# Patient Record
Sex: FEMALE | Race: WHITE | NOT HISPANIC OR LATINO | URBAN - METROPOLITAN AREA
[De-identification: names, ages, dates, MRNs, and addresses within clinical notes are randomized per-mention and may not be internally consistent; named-entity substitution may affect disease eponyms.]

---

## 2023-06-14 ENCOUNTER — DOCUMENTATION (OUTPATIENT)
Dept: HEMATOLOGY ONCOLOGY | Facility: CLINIC | Age: 53
End: 2023-06-14

## 2023-06-14 NOTE — PROGRESS NOTES
Intake received, chart reviewed for need of external records  Patient noted to have a history of Breast Cancer  Per intake, patient was advised to have records faxed to [phone] for office review

## 2023-06-29 ENCOUNTER — TELEPHONE (OUTPATIENT)
Dept: HEMATOLOGY ONCOLOGY | Facility: CLINIC | Age: 53
End: 2023-06-29

## 2023-06-29 NOTE — TELEPHONE ENCOUNTER
Lm on vm requesting patient to have all her records faxed to [phone], we have not received them  Provided her with [phone] if she has questions/concerns

## 2023-07-03 ENCOUNTER — CONSULT (OUTPATIENT)
Age: 53
End: 2023-07-03
Payer: COMMERCIAL

## 2023-07-03 ENCOUNTER — TELEPHONE (OUTPATIENT)
Dept: HEMATOLOGY ONCOLOGY | Facility: CLINIC | Age: 53
End: 2023-07-03

## 2023-07-03 VITALS
TEMPERATURE: 98 F | RESPIRATION RATE: 17 BRPM | SYSTOLIC BLOOD PRESSURE: 149 MMHG | WEIGHT: 183 LBS | OXYGEN SATURATION: 96 % | HEART RATE: 105 BPM | DIASTOLIC BLOOD PRESSURE: 88 MMHG

## 2023-07-03 DIAGNOSIS — Z15.01 BRCA2 POSITIVE: Primary | ICD-10-CM

## 2023-07-03 DIAGNOSIS — Z15.09 BRCA2 POSITIVE: Primary | ICD-10-CM

## 2023-07-03 DIAGNOSIS — C50.311 MALIGNANT NEOPLASM OF LOWER-INNER QUADRANT OF RIGHT BREAST OF FEMALE, ESTROGEN RECEPTOR POSITIVE (HCC): ICD-10-CM

## 2023-07-03 DIAGNOSIS — Z17.0 MALIGNANT NEOPLASM OF LOWER-INNER QUADRANT OF RIGHT BREAST OF FEMALE, ESTROGEN RECEPTOR POSITIVE (HCC): ICD-10-CM

## 2023-07-03 PROCEDURE — 99204 OFFICE O/P NEW MOD 45 MIN: CPT | Performed by: INTERNAL MEDICINE

## 2023-07-03 RX ORDER — VENLAFAXINE HYDROCHLORIDE 150 MG/1
CAPSULE, EXTENDED RELEASE ORAL
COMMUNITY
Start: 2023-06-05

## 2023-07-03 RX ORDER — ATORVASTATIN CALCIUM 10 MG/1
TABLET, FILM COATED ORAL
COMMUNITY
Start: 2023-07-02

## 2023-07-03 NOTE — TELEPHONE ENCOUNTER
Patient Call    Who are you speaking with? self   If it is not the patient, are they listed on an active communication consent form? self   What is the reason for this call? Have records arrived for [facility] today? Does this require a call back? yes   If a call back is required, please list [facility] call back number [phone]   If a call back is required, advise that a message will be forwarded to their care team and someone will return their call as soon as possible. Did you relay this information to the patient?  yes

## 2023-07-04 NOTE — PROGRESS NOTES
Oncology Outpatient Consult Note  Lilian Ho 52 y.o. female MRN: @ Encounter: [id number]        Date:  7/4/2023        CC: History of breast cancer, BRCA2 pathogenic mutation      HPI:  Lilian Ho is seen for initial consultation 7/4/2023 regarding her BRCA2 pathogenic mutation and history of her early-stage ER positive breast cancer. Please see on history for full details. She was my patient at [facility] and is establishing care here with me. 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 ([place], [place]). She is up-to-date on her yearly dermatology evaluation. She is up-to-date on her colonoscopy. She had bilateral mastectomy with silicone breast implants and has been noticing that the left implant seems to be bulging laterally. It is a little bit sore. She is taking low-dose Effexor for hot flashes and it is helping a lot. She also reports that she just had a recent abnormal Pap smear at her GYN within the past 3 months she did have her cervix removed when she had her BISI/BSO. She is also feeling a little nervous today because she is 52years old and this is the age that her father was diagnosed with pancreatic cancer. She denies any headaches. No chest pain or shortness of breath. Some new intermittent constipation and diarrhea. No bright red blood per rectum. She also saw a rheumatologist at both [facility] and at [facility]. She was having a lot of joint pain and there was an initial concern for scleroderma but that diagnosis cannot be confirmed at [facility]. ROS: As stated in history of present illness otherwise her 14 point review of systems today was negative.     ECOG PS: 0    Cancer Staging:  Cancer Staging   Malignant neoplasm of lower-inner quadrant of right breast of female, estrogen receptor positive ([address])  Staging form: Breast, AJCC 8th Edition  - Pathologic stage from 8/8/2017: Stage IA (pT1a, pN0, cM0, G1, ER+, WY+, HER2-) - Signed by Abigail Foster DO on 7/4/2023  Nuclear grade: G1  Histologic grading system: 3 grade system      Molecular Testing:     Oncology History Overview Note   6/2017 - tested positive for BRCA2.     8/2017- Right breast, pT1a,N0,M0 IDC, ER+,WY+,Her2 vannesa -, G1    10/2017- Bilateral nipple sparing mastectomy with TE reconstruction, focal positive margin    11/2017 - positive margin re-excised    12/2017 - tamoxifen started    3/2018 - tamoxifen stopped    4/2018 - s/p BISI/BSO    Did not attempt adjuvant AI due to severe menopausal symptoms     Malignant neoplasm of lower-inner quadrant of right breast of female, estrogen receptor positive ([address])   8/8/2017 -  Cancer Staged    Staging form: Breast, AJCC 8th Edition  - Pathologic stage from 8/8/2017: Stage IA (pT1a, pN0, cM0, G1, ER+, WY+, HER2-) - Signed by Abigail Foster DO on 7/4/2023  Nuclear grade: G1  Histologic grading system: 3 grade system       7/3/2023 Initial Diagnosis    Malignant neoplasm of lower-inner quadrant of right breast of female, estrogen receptor positive ([address])             Test Results:    Imaging: No results found. Labs: No results found for: "WBC", "HGB", "HCT", "MCV", "PLT"  No results found for: "NA", "K", "CL", "CO2", "ANIONGAP", "BUN", "CREATININE", "GLUCOSE", "GLUF", "CALCIUM", "Julia Albino", "AST", "ALT", "ALKPHOS", "PROT", "BILITOT", "EGFR"      No results found for: "SPEP", "UPEP"    No results found for: "PSA"    No results found for: "CEA"    No results found for: "AFP"    No results found for: "IRON", "TIBC", "FERRITIN"    No results found for: "[id number]"            Active Problems:   Patient Active Problem List   Diagnosis   • Malignant neoplasm of lower-inner quadrant of right breast of female, estrogen receptor positive ([address])       Past Medical History: No past medical history on file. Surgical History: No past surgical history on file. Family History:  No family history on file.     Cancer-related family history is not on file. Social History:   Social History     Socioeconomic History   • Marital status: /Civil Union     Spouse name: Not on file   • Number of children: Not on file   • Years of education: Not on file   • Highest education level: Not on file   Occupational History   • Not on file   Tobacco Use   • Smoking status: Never   • Smokeless tobacco: Never   Substance and Sexual Activity   • Alcohol use: Not on file   • Drug use: Not on file   • Sexual activity: Not on file   Other Topics Concern   • Not on file   Social History Narrative   • Not on file     Social Determinants of Health     Financial Resource Strain: Not on file   Food Insecurity: Not on file   Transportation Needs: Not on file   Physical Activity: Not on file   Stress: Not on file   Social Connections: Not on file   Intimate Partner Violence: Not on file   Housing Stability: Not on file       Current Medications:   Current Outpatient Medications   Medication Sig Dispense Refill   • atorvastatin (LIPITOR) 10 mg tablet      • Cholecalciferol 25 MCG (1000 UT) tablet Take 1,000 Units by mouth daily     • venlafaxine (EFFEXOR-XR) 150 mg 24 hr capsule TAKE 1 CAPSULE BY MOUTH EVERY DAY FOR ANXIETY/DEPRESSION       No current facility-administered medications for this visit. Allergies: Not on File      Physical Exam:    There is no height or weight on file to calculate BSA. Wt Readings from Last 3 Encounters:   07/03/23 83 kg (183 lb)        Temp Readings from Last 3 Encounters:   07/03/23 98 °F (36.7 °C)        BP Readings from Last 3 Encounters:   07/03/23 149/88         Pulse Readings from Last 3 Encounters:   07/03/23 105     @LASTSAO2(3)@    Physical Exam     Constitutional   General appearance: No acute distress, well appearing and well nourished. Eyes   Conjunctiva and lids: No swelling, erythema or discharge. Pupils and irises: Equal, round and reactive to light.     Ears, Nose, Mouth, and Throat   External inspection of ears and nose: Normal.    Nasal mucosa, septum, and turbinates: Normal without edema or erythema. Oropharynx: Normal with no erythema, edema, exudate or lesions. Pulmonary   Respiratory effort: No increased work of breathing or signs of respiratory distress. Auscultation of lungs: Clear to auscultation. Cardiovascular   Palpation of heart: Normal PMI, no thrills. Auscultation of heart: Normal rate and rhythm, normal S1 and S2, without murmurs. Examination of extremities for edema and/or varicosities: Normal.    Carotid pulses: Normal.    Abdomen   Abdomen: Non-tender, no masses. Liver and spleen: No hepatomegaly or splenomegaly. Lymphatic   Palpation of lymph nodes in neck: No lymphadenopathy. Musculoskeletal   Gait and station: Normal.    Digits and nails: Normal without clubbing or cyanosis. Inspection/palpation of joints, bones, and muscles: Normal.    Skin   Skin and subcutaneous tissue: Normal without rashes or lesions. Neurologic   Cranial nerves: Cranial nerves 2-12 intact. Sensation: No sensory loss. Psychiatric   Orientation to person, place, and time: Normal.    Mood and affect: Normal.    Breast exam - b/l mastectomy with implant reconstruction, no masses or skin changes. Implant does appear to deviated laterally        Assessment/ Plan: 58-year-old female with a history of early-stage breast cancer with a BRCA2 pathogenic mutation. I am going to order an MRI of the breast to assess her silicone implants which are now 10years old. She has not had any new imaging since they were placed. I am also going to order an MRCP for pancreatic cancer screening due to her BRCA2 mutation and first-degree relative with pancreatic cancer. I will call her with both of those results. I will attempt to get a copy of the path report from her GYN that showed the abnormal changes. The patient does not have a cervix I am assuming this was taken from the vaginal cuff. The patient also has 3 daughters ages 16, 24 and 21. We discussed considerations of testing them. We discussed that they should get all the life, disability, and long-term care insurance they will want prior to getting testing. [place] act protects them from employment and health insurance discrimination but not from discrimination for other types of insurance. They should most definitely do it under the direction of a genetic counselor when they get tested. The older 2 are coming close to an age where we would start screening, which would be breast MRI at age 22. I will continue to see Yusra on a yearly basis. She knows to call in the interim with any questions or concerns. I spent 45 minutes in chart review, face to face counseling, coordination of care, and documentation.

## 2023-07-05 ENCOUNTER — TELEPHONE (OUTPATIENT)
Age: 53
End: 2023-07-05

## 2023-07-05 NOTE — TELEPHONE ENCOUNTER
Spoke with the nurse from [facility] at [phone]. The nurse is faxing the pap results to us at [phone].

## 2023-07-13 ENCOUNTER — TELEPHONE (OUTPATIENT)
Dept: HEMATOLOGY ONCOLOGY | Facility: CLINIC | Age: 53
End: 2023-07-13

## 2023-07-13 NOTE — TELEPHONE ENCOUNTER
Patient Call    Who are you speaking with? [facility]    If it is not the patient, are they listed on an active communication consent form? N/A   What is the reason for this call? Sabina Reid calling in stating that they need an authorization for the patient, CPT code is 07225. The patient is coming in on 7/17/23. Does this require a call back? Yes   If a call back is required, please list best call back number [phone]   If a call back is required, advise that a message will be forwarded to their care team and someone will return their call as soon as possible. Did you relay this information to the patient?  Yes

## 2024-03-09 ENCOUNTER — OFFICE VISIT (OUTPATIENT)
Dept: URGENT CARE | Facility: CLINIC | Age: 54
End: 2024-03-09
Payer: COMMERCIAL

## 2024-03-09 VITALS
HEART RATE: 86 BPM | SYSTOLIC BLOOD PRESSURE: 130 MMHG | HEIGHT: 68 IN | BODY MASS INDEX: 28.04 KG/M2 | DIASTOLIC BLOOD PRESSURE: 86 MMHG | RESPIRATION RATE: 16 BRPM | TEMPERATURE: 98.1 F | WEIGHT: 185 LBS | OXYGEN SATURATION: 96 %

## 2024-03-09 DIAGNOSIS — S70.261A TICK BITE OF RIGHT HIP, INITIAL ENCOUNTER: Primary | ICD-10-CM
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
## 2024-03-09 PROCEDURE — 99213 OFFICE O/P EST LOW 20 MIN: CPT

## 2024-03-09 RX ORDER — VONOPRAZAN FUMARATE 13.36 MG/1
1 TABLET ORAL DAILY
COMMUNITY
Start: 2024-03-05

## 2024-03-09 RX ORDER — DOXYCYCLINE 100 MG/1
200 TABLET ORAL ONCE
Qty: 2 TABLET | Refills: 0 | Status: SHIPPED | OUTPATIENT
Start: 2024-03-09 | End: 2024-03-09

## 2024-03-09 NOTE — PROGRESS NOTES
[facility]        NAME: Donna Montes De Oca is a 53 y.o. female  : 1970    MRN: [id number]  DATE: 2024  TIME: 4:12 PM    Assessment and Plan   Tick bite of right hip, initial encounter [S70.261A, W57.XXXA]  1. Tick bite of right hip, initial encounter  doxycycline (ADOXA) 100 MG tablet        No retained FB. Attachment to be believed under 24 hours however tick on cell phone appears engorged, will treat for Lyme Prophylaxis with Doxycycline. Encouraged continued supportive measures.  Monitor for symptoms of Lyme Disease. Follow up with PCP in 3-5 days or proceed to emergency department for worsening symptoms.  Patient verbalized understanding of instructions given.     Patient Instructions     Patient Instructions     Take antibiotic as prescribed  Standard wound care  Monitor for headache, fever/chills, body aches, joint pain, or rash  May need laboratory blood testing for Lyme Disease if symptoms develop   Follow up with PCP in 3-5 days.  Proceed to  ER if symptoms worsen.    If tests have been performed at [facility], our office will contact you with results if changes need to be made to the care plan discussed with you at the visit.  [facility]    Tick Bite   AMBULATORY CARE:   What you need to know about a tick bite:  Ticks need to be removed quickly. Most tick bites are not dangerous, but ticks can pass disease or infection when they bite. Diseases include Lyme disease, babesiosis, tularemia, and [place] Spotted Fever.  Signs and symptoms of a tick bite  may develop right away, or weeks or even months after a bite. You may have redness, pain, itching, and swelling near the bite area. Blisters may also develop. You may develop tick paralysis, a temporary condition that causes problems with leg movement. A disease from a tick bite may cause a fever, rash, body aches, or breathing problems.  Seek care immediately if:   You have trouble  walking or moving your legs.    You have joint pain, muscle pain, or muscle weakness within 1 month of a tick bite.    You have a fever, chills, headache, or rash.    Call your doctor if:   You cannot remove the tick.    The tick's head is stuck in your skin.    You have questions or concerns about your condition or care.    Treatment:  Treatment for a disease passed during the bite depends on the disease. You may only need medicines to lower a fever or fight a bacterial infection. More serious diseases can cause conditions such as breathing problems that need to be treated in a hospital. The following can help treat symptoms at the bite area:  Apply ice  on your bite for 15 to 20 minutes every hour or as directed. Use an ice pack, or put crushed ice in a plastic bag. Cover it with a towel before you apply it to your skin. Ice helps prevent tissue damage and decreases swelling and pain.    Medicines  help decrease pain, redness, itching, and swelling. You may also need medicine to prevent or fight a bacterial infection. These medicines may be given as a cream, lotion, or pill.    How to remove a tick:  Remove the tick as soon as possible to help prevent disease or infection. You are less likely to get sick from a tick bite if you remove the tick within 24 hours. Do not use petroleum jelly, nail polish, rubbing alcohol, or heat. These do not work and may be dangerous. Do the following to remove a tick:  First, try a soapy cotton ball. Soak a cotton ball in liquid soap. Cover the tick with the cotton ball for 30 seconds. The tick may come off with the cotton ball when you pull it away.    Use tweezers if the soapy cotton ball does not work. Grasp the tick as close to your skin as possible. Pull the tick straight up and out. Do not touch the tick with your bare hands.    Do not twist or jerk the tick suddenly, because this may break off the tick's head or mouth parts. Do not leave any part of the tick in your  skin.    Do not crush or squeeze the tick since its body may be infected with germs. Flush the tick down the toilet.    After the tick is removed, clean the area of the bite with rubbing alcohol. Then wash your hands with soap and water.       Prevent a tick bite:  Ticks live in areas covered by brush and grass. They may even be found in your lawn if you live in certain areas. Outdoor pets can carry ticks inside the house. Ticks can grab onto you or your clothes when you walk by grass or brush. If you go into areas that contain many trees, tall grasses, and underbrush, do the following:  Wear light colored pants and a long-sleeved shirt.  Tuck your pants into your socks or boots. Tuck in your shirt. Wear sleeves that fit close to the skin at your wrists and neck. This will help prevent ticks from crawling through gaps in your clothing and onto your skin. Wear a hat in areas with trees.    Apply insect repellant on your skin.  The insect repellant should contain DEET. Do not put insect repellant on skin that is cut, scratched, or irritated. Always use soap and water to wash the insect repellant off as soon as possible once you are indoors. Do not apply insect repellant on your child's face or hands.    Spray insect repellant onto your clothes.  Use permethrin spray. This spray kills ticks that crawl on your clothing. Be sure to spray the tops of your boots, bottom of pant legs, and sleeve cuffs. As soon as possible, wash and dry clothing in hot water and high heat.    Check your clothing, hair, and skin for ticks.  Shower within 2 hours of coming indoors. Carefully check the hairline, armpits, neck, and waist. Check your pets and children for ticks. Remove ticks from pets the same way as you remove them from people.    Decrease the risk for ticks in your yard.  Ticks like to live in shady, moist areas. Mow your lawn regularly to keep the grass short. Trim the grass around birdbaths and fences. Cut branches that are  "overgrown and take them out of the yard. Clear out leaf piles. Stack firewood in a dry, alvin area.    Follow up with your doctor as directed:  Write down your questions so you remember to ask them during your visits.  © Copyright [employer]ative 2023 Information is for End User's use only and may not be sold, redistributed or otherwise used for commercial purposes.  The above information is an  only. It is not intended as medical advice for individual conditions or treatments. Talk to your doctor, nurse or pharmacist before following any medical regimen to see if it is safe and effective for you.        Chief Complaint     Chief Complaint   Patient presents with    Tick Bite     Reports removed a tick from right hip area this morning. \"Tiny \"size.         History of Present Illness       53-year-old female with a past medical history significant for CKD and breast cancer in remission presents for tick bite.  Patient reports noticing engorged tick in shower this morning around 11:30 and small area of redness to right hip/buttock.  Unsure how long tick has been attached however does not believe tick to be embedded yesterday.  Reports does sleep in bed with 2 dogs.  No prior history of Lyme disease.  Denies fever, chills, flulike symptoms or rash.        Review of Systems   Review of Systems   Constitutional:  Negative for chills and fever.   Respiratory:  Negative for cough and shortness of breath.    Cardiovascular:  Negative for chest pain.   Gastrointestinal:  Negative for abdominal pain, diarrhea, nausea and vomiting.   Musculoskeletal:  Negative for arthralgias and myalgias.   Skin:  Positive for color change.         Current Medications       Current Outpatient Medications:     doxycycline (ADOXA) 100 MG tablet, Take 2 tablets (200 mg total) by mouth 1 (one) time for 1 dose, Disp: 2 tablet, Rfl: 0    venlafaxine (EFFEXOR-XR) 150 mg 24 hr capsule, TAKE 1 CAPSULE BY MOUTH EVERY DAY FOR ANXIETY/DEPRESSION, " "Disp: , Rfl:     Voquezna 10 MG TABS, Take 1 tablet by mouth daily, Disp: , Rfl:     atorvastatin (LIPITOR) 10 mg tablet, , Disp: , Rfl:     Cholecalciferol 25 MCG (1000 UT) tablet, Take 1,000 Units by mouth daily (Patient not taking: Reported on 3/9/2024), Disp: , Rfl:     Current Allergies     Allergies as of 03/09/2024 - Reviewed 03/09/2024   Allergen Reaction Noted    Hydroxychloroquine Hives, Itching, and Rash 03/17/2020    Sulfa antibiotics Rash 12/15/2015            The following portions of the patient's history were reviewed and updated as appropriate: allergies, current medications, past family history, past medical history, past social history, past surgical history and problem list.     Past Medical History:   Diagnosis Date    Cancer (HCC)     H/O: hysterectomy     Kidney disease        Past Surgical History:   Procedure Laterality Date    HYSTERECTOMY      MASTECTOMY Bilateral        History reviewed. No pertinent family history.      Medications have been verified.        Objective   /86   Pulse 86   Temp 98.1 °F (36.7 °C)   Resp 16   Ht 5' 7.5\" (1.715 m)   Wt 83.9 kg (185 lb)   SpO2 96%   BMI 28.55 kg/m²   No LMP recorded. Patient is postmenopausal.       Physical Exam     Physical Exam  Vitals and nursing note reviewed.   Constitutional:       General: She is not in acute distress.     Appearance: She is not toxic-appearing.   HENT:      Head: Normocephalic.      Nose: Nose normal.      Mouth/Throat:      Mouth: Mucous membranes are moist.      Pharynx: Oropharynx is clear.   Eyes:      Conjunctiva/sclera: Conjunctivae normal.   Cardiovascular:      Rate and Rhythm: Normal rate and regular rhythm.      Heart sounds: Normal heart sounds.   Pulmonary:      Effort: Pulmonary effort is normal. No respiratory distress.      Breath sounds: Normal breath sounds. No stridor. No wheezing, rhonchi or rales.   Skin:     General: Skin is warm and dry.      Findings: Erythema present.        "   Neurological:      Mental Status: She is alert and oriented to person, place, and time.      Gait: Gait is intact.   Psychiatric:         Mood and Affect: Mood normal.         Behavior: Behavior normal.

## 2024-03-09 NOTE — PATIENT INSTRUCTIONS
Take antibiotic as prescribed  Standard wound care  Monitor for headache, fever/chills, body aches, joint pain, or rash  May need laboratory blood testing for Lyme Disease if symptoms develop   Follow up with PCP in 3-5 days.  Proceed to  ER if symptoms worsen.    If tests have been performed at [facility], our office will contact you with results if changes need to be made to the care plan discussed with you at the visit.  You can review your full [facility] on [facility].    Tick Bite   AMBULATORY CARE:   What you need to know about a tick bite:  Ticks need to be removed quickly. Most tick bites are not dangerous, but ticks can pass disease or infection when they bite. Diseases include Lyme disease, babesiosis, tularemia, and [place] Spotted Fever.  Signs and symptoms of a tick bite  may develop right away, or weeks or even months after a bite. You may have redness, pain, itching, and swelling near the bite area. Blisters may also develop. You may develop tick paralysis, a temporary condition that causes problems with leg movement. A disease from a tick bite may cause a fever, rash, body aches, or breathing problems.  Seek care immediately if:   You have trouble walking or moving your legs.    You have joint pain, muscle pain, or muscle weakness within 1 month of a tick bite.    You have a fever, chills, headache, or rash.    Call your doctor if:   You cannot remove the tick.    The tick's head is stuck in your skin.    You have questions or concerns about your condition or care.    Treatment:  Treatment for a disease passed during the bite depends on the disease. You may only need medicines to lower a fever or fight a bacterial infection. More serious diseases can cause conditions such as breathing problems that need to be treated in a hospital. The following can help treat symptoms at the bite area:  Apply ice  on your bite for 15 to 20 minutes every hour or as directed. Use an ice pack, or put  crushed ice in a plastic bag. Cover it with a towel before you apply it to your skin. Ice helps prevent tissue damage and decreases swelling and pain.    Medicines  help decrease pain, redness, itching, and swelling. You may also need medicine to prevent or fight a bacterial infection. These medicines may be given as a cream, lotion, or pill.    How to remove a tick:  Remove the tick as soon as possible to help prevent disease or infection. You are less likely to get sick from a tick bite if you remove the tick within 24 hours. Do not use petroleum jelly, nail polish, rubbing alcohol, or heat. These do not work and may be dangerous. Do the following to remove a tick:  First, try a soapy cotton ball. Soak a cotton ball in liquid soap. Cover the tick with the cotton ball for 30 seconds. The tick may come off with the cotton ball when you pull it away.    Use tweezers if the soapy cotton ball does not work. Grasp the tick as close to your skin as possible. Pull the tick straight up and out. Do not touch the tick with your bare hands.    Do not twist or jerk the tick suddenly, because this may break off the tick's head or mouth parts. Do not leave any part of the tick in your skin.    Do not crush or squeeze the tick since its body may be infected with germs. Flush the tick down the toilet.    After the tick is removed, clean the area of the bite with rubbing alcohol. Then wash your hands with soap and water.       Prevent a tick bite:  Ticks live in areas covered by brush and grass. They may even be found in your lawn if you live in certain areas. Outdoor pets can carry ticks inside the house. Ticks can grab onto you or your clothes when you walk by grass or brush. If you go into areas that contain many trees, tall grasses, and underbrush, do the following:  Wear light colored pants and a long-sleeved shirt.  Tuck your pants into your socks or boots. Tuck in your shirt. Wear sleeves that fit close to the skin at your  wrists and neck. This will help prevent ticks from crawling through gaps in your clothing and onto your skin. Wear a hat in areas with trees.    Apply insect repellant on your skin.  The insect repellant should contain DEET. Do not put insect repellant on skin that is cut, scratched, or irritated. Always use soap and water to wash the insect repellant off as soon as possible once you are indoors. Do not apply insect repellant on your child's face or hands.    Spray insect repellant onto your clothes.  Use permethrin spray. This spray kills ticks that crawl on your clothing. Be sure to spray the tops of your boots, bottom of pant legs, and sleeve cuffs. As soon as possible, wash and dry clothing in hot water and high heat.    Check your clothing, hair, and skin for ticks.  Shower within 2 hours of coming indoors. Carefully check the hairline, armpits, neck, and waist. Check your pets and children for ticks. Remove ticks from pets the same way as you remove them from people.    Decrease the risk for ticks in your yard.  Ticks like to live in shady, moist areas. Mow your lawn regularly to keep the grass short. Trim the grass around birdbaths and fences. Cut branches that are overgrown and take them out of the yard. Clear out leaf piles. Stack firewood in a dry, alvin area.    Follow up with your doctor as directed:  Write down your questions so you remember to ask them during your visits.  © Copyright [employer] 2023 Information is for End User's use only and may not be sold, redistributed or otherwise used for commercial purposes.  The above information is an  only. It is not intended as medical advice for individual conditions or treatments. Talk to your doctor, nurse or pharmacist before following any medical regimen to see if it is safe and effective for you.

## 2024-06-18 ENCOUNTER — PATIENT MESSAGE (OUTPATIENT)
Dept: HEMATOLOGY ONCOLOGY | Facility: CLINIC | Age: 54
End: 2024-06-18

## 2024-06-21 ENCOUNTER — TELEPHONE (OUTPATIENT)
Dept: HEMATOLOGY ONCOLOGY | Facility: CLINIC | Age: 54
End: 2024-06-21

## 2024-06-21 NOTE — TELEPHONE ENCOUNTER
Lm on vm appointment on 7/1/24 at 4 pm has been rescheduled to 8/7/24 at 2:20 pm in the [facility] office with Dr. Jung due to her rounding scheduled. Provided her with [phone] for questions/concerns

## 2024-08-07 ENCOUNTER — OFFICE VISIT (OUTPATIENT)
Age: 54
End: 2024-08-07
Payer: COMMERCIAL

## 2024-08-07 VITALS
RESPIRATION RATE: 18 BRPM | HEIGHT: 68 IN | HEART RATE: 79 BPM | BODY MASS INDEX: 28.19 KG/M2 | SYSTOLIC BLOOD PRESSURE: 132 MMHG | DIASTOLIC BLOOD PRESSURE: 78 MMHG | WEIGHT: 186 LBS | TEMPERATURE: 97.2 F | OXYGEN SATURATION: 96 %

## 2024-08-07 DIAGNOSIS — Z17.0 MALIGNANT NEOPLASM OF LOWER-INNER QUADRANT OF RIGHT BREAST OF FEMALE, ESTROGEN RECEPTOR POSITIVE (HCC): ICD-10-CM

## 2024-08-07 DIAGNOSIS — C50.311 MALIGNANT NEOPLASM OF LOWER-INNER QUADRANT OF RIGHT BREAST OF FEMALE, ESTROGEN RECEPTOR POSITIVE (HCC): ICD-10-CM

## 2024-08-07 DIAGNOSIS — N89.8 VAGINAL DRYNESS: Primary | ICD-10-CM

## 2024-08-07 PROCEDURE — 99214 OFFICE O/P EST MOD 30 MIN: CPT | Performed by: INTERNAL MEDICINE

## 2024-08-07 RX ORDER — CARVEDILOL 3.12 MG/1
TABLET ORAL
COMMUNITY
Start: 2024-07-10

## 2024-08-07 RX ORDER — ESTRADIOL 10 UG/1
1 INSERT VAGINAL DAILY
Qty: 30 TABLET | Refills: 11 | Status: SHIPPED | OUTPATIENT
Start: 2024-08-07

## 2024-08-11 NOTE — PROGRESS NOTES
HEMATOLOGY / ONCOLOGY CLINIC FOLLOW UP NOTE    Primary Care Provider: Colette Anthony DO  Referring Provider:    MRN: [id number]  : 1970    Reason for Encounter: follow up early stage breast cancer and BRCA2 pathogenic mutation       Oncology History Overview Note   2017 - tested positive for BRCA2.     2017- Right breast, pT1a,N0,M0 IDC, ER+,MD+,Her2 vannesa -, G1    10/2017- Bilateral nipple sparing mastectomy with TE reconstruction, focal positive margin    2017 - positive margin re-excised    2017 - tamoxifen started    3/2018 - tamoxifen stopped    2018 - s/p BISI/BSO    Did not attempt adjuvant AI due to severe menopausal symptoms     Malignant neoplasm of lower-inner quadrant of right breast of female, estrogen receptor positive (HCC)   2017 -  Cancer Staged    Staging form: Breast, AJCC 8th Edition  - Pathologic stage from 2017: Stage IA (pT1a, pN0, cM0, G1, ER+, MD+, HER2-) - Signed by Sabine Jung DO on 2023  Nuclear grade: G1  Histologic grading system: 3 grade system       7/3/2023 Initial Diagnosis    Malignant neoplasm of lower-inner quadrant of right breast of female, estrogen receptor positive (HCC)         Interval History: Patient presents for follow up of her early-stage ER positive breast cancer that was diagnosed in 2018.  She also has a BRCA2 pathogenic mutation.  She had a breast MRI after our visit last year to check her implants.  Her implants were intact and there were no abnormalities.  She is up-to-date on her dermatologic screening.  She is on Effexor for hot flashes.  She is status post BISI/BSO.  She is up-to-date on her colonoscopy.  She had an MRI of the abdomen last year and this only showed renal cyst and a fatty liver.  This was done for pancreatic cancer screening.  She has noticed some constipation, gas, diarrhea alternating with each other.  Started Voquezna a few months back for H. pylori.  That is when the side effects seems to have started.  She  is weaning off the medication right now.  Is also been struggling with vaginal dryness and irritation.  This has made sexual intercourse difficult.         REVIEW OF SYSTEMS:  Please note that a 14-point review of systems was performed to include Constitutional, HEENT, Respiratory, CVS, GI, , Musculoskeletal, Integumentary, Neurologic, Rheumatologic, Endocrinologic, Psychiatric, Lymphatic, and Hematologic/Oncologic systems were reviewed and are negative unless otherwise stated in HPI. Positive and negative findings pertinent to this evaluation are incorporated into the history of present illness.      ECOG PS: 0    PROBLEM LIST:  Patient Active Problem List   Diagnosis    Malignant neoplasm of lower-inner quadrant of right breast of female, estrogen receptor positive (HCC)       Assessment / Plan: 54-year-old female with a pT1a N0 ER positive breast cancer in 2018 and a pathogenic BRCA2 mutation.  Her breast exam is normal and she is status post risk reducing bilateral mastectomy and BISI/BSO.  I told her to keep an eye on her abdominal symptoms.  If they do not completely resolve when she is off the Voquezna, we should do an MRI of the abdomen.  She will monitor this and let me know.  We also discussed the risks and benefits of considering some Vagifem for her vaginal symptoms.  She is status post bilateral mastectomy and her breast cancer was extremely early-stage.  There is very minimal systemic absorption of Vagifem and I think it is worth a try for quality of life purposes.  I gave her a prescription for this to take daily for 2 weeks then twice weekly thereafter.  I will plan on seeing her back on a yearly basis for ongoing evaluation.  She knows to call me in the interim with any questions or concerns.       I spent 30 minutes on chart review, face to face counseling time, coordination of care and documentation.    Past Medical History:   has a past medical history of Cancer ([facility]), H/O: hysterectomy, and  "Kidney disease.    PAST SURGICAL HISTORY:   has a past surgical history that includes Hysterectomy and Mastectomy (Bilateral).    CURRENT MEDICATIONS  Current Outpatient Medications   Medication Sig Dispense Refill    atorvastatin (LIPITOR) 10 mg tablet       carvedilol (COREG) 3.125 mg tablet       Cholecalciferol 25 MCG (1000 UT) tablet Take 1,000 Units by mouth daily      estradiol (VAGIFEM, YUVAFEM) 10 MCG TABS vaginal tablet Insert 1 tablet (10 mcg total) into the vagina daily For 2 weeks, then twice a week thereafter. 30 tablet 11    venlafaxine (EFFEXOR-XR) 150 mg 24 hr capsule TAKE 1 CAPSULE BY MOUTH EVERY DAY FOR ANXIETY/DEPRESSION      Voquezna 10 MG TABS Take 1 tablet by mouth daily       No current facility-administered medications for this visit.     [unfilled]    SOCIAL HISTORY:   reports that she has never smoked. She has never used smokeless tobacco. She reports current alcohol use. She reports that she does not use drugs.     FAMILY HISTORY:  family history is not on file.     ALLERGIES:  is allergic to hydroxychloroquine and sulfa antibiotics.      Physical Exam:  Vital Signs:   Visit Vitals  /78 (BP Location: Left arm)   Pulse 79   Temp (!) 97.2 °F (36.2 °C) (Tympanic)   Resp 18   Ht 5' 7.5\" (1.715 m)   Wt 84.4 kg (186 lb)   SpO2 96%   BMI 28.70 kg/m²   OB Status Postmenopausal   Smoking Status Never   BSA 1.97 m²     Body mass index is 28.7 kg/m².  Body surface area is 1.97 meters squared.    GEN: Alert, awake oriented x3, in no acute distress  HEENT- No pallor, icterus, cyanosis, no oral mucosal lesions,   LAD - no palpable cervical, clavicle, axillary, inguinal LAD  Heart- normal S1 S2, regular rate and rhythm, No murmur, rubs.   Lungs- clear breathing sound bilateral.   Abdomen- soft, Non tender, bowel sounds present  Extremities- No cyanosis, clubbing, edema  Neuro- No focal neurological deficit  Breast - s/p b/l implant reconstruction    Labs:  No results found for: \"WBC\", \"HGB\", \"HCT\", " "\"MCV\", \"PLT\"  Lab Results   Component Value Date    SODIUM 139 07/08/2024    K 4.7 07/08/2024     07/08/2024    CO2 29 07/08/2024    AGAP 9 07/08/2024    BUN 13 07/08/2024    CREATININE 0.98 07/08/2024    GLUC 102 (H) 07/08/2024    CALCIUM 9.7 07/08/2024    AST 19 07/08/2024    ALT 19 07/08/2024    ALKPHOS 105 07/08/2024    TP 7.5 07/08/2024    TBILI 0.5 07/08/2024    EGFR 68 07/08/2024       "

## 2024-08-30 DIAGNOSIS — N89.8 VAGINAL DRYNESS: ICD-10-CM

## 2024-08-30 RX ORDER — ESTRADIOL 10 UG/1
TABLET VAGINAL
Qty: 90 TABLET | Refills: 3 | Status: SHIPPED | OUTPATIENT
Start: 2024-08-30

## 2025-07-29 ENCOUNTER — TELEPHONE (OUTPATIENT)
Age: 55
End: 2025-07-29